# Patient Record
(demographics unavailable — no encounter records)

---

## 2020-01-01 NOTE — NUR
BABY DID NOT EAT FOR MOM RETURNED TO NURSERY VIA OC TEMP 96.9 RECTALLY. UNDER
WARMER WITH TEMP PROBE AND SERVO. NURSE ATTEMPTED TP FEED BABY BABY 
NIPPLE OR CLAMPS JAWS AND WILL NOT SUCK. DSTICK REPEATED 76.

## 2020-01-01 NOTE — NUR
FOB BROUGHT INFANT TO NBN FOR ASSISTANCE WITH DIAPR CHANGE. CORD CLAMP
REMOVED. FOB WITH INFANT BACK TO ROOM

## 2020-01-01 NOTE — NUR
INFANT RETURNED TO MOTHER'S ROOM, BANDS VERIFIED TO MATCH. MOTHER AAOx3.
FATHER RESTING ON BEDSIDE COUCH. INFANT SWADDLED WITH HAT ON AND RESTING IN
BASSINETTE WHILE MOTHER EATS BREAKFAST.

## 2020-01-01 NOTE — NUR
MOTHER REQUESTING  BACK TO ROOM, ID BANDS MATCHED,  HANDED TO
MOTHER, GOOD BONDING NOTED.  TEACHING DONE WITH PARENTS ON BREAST AND BOTTLE
FEEDING, FEEDING SHEET, DIAPERING.  VOICES UNDERSTANDING, DENIES ANY QUESTIONS
AT THIS TIME.

## 2020-01-01 NOTE — NUR
OUT TO ROOM VIA OC ENC MOM AND DAD TO ATTEMPT TO FEED AGAIN IN THE NEXT HOUR
OR SO THAT HER BLOOD SUGAR WAS GOOD, TEMP IS GOOD, BUT NURSE WAS UNABLE TO
GET HER TO TAKE ANY FORMULA. MOM AND DAD AGREED.

## 2020-01-01 NOTE — NUR
BABY IN CRIB AT BEDSIDE MOM HAS FED 10MLS 5 MINUTES AGO. ENC MO TO BURP BABY
AND KEEP FEEDING. MOM ASKED FOR ASSISTANCE BURPING BABY. UP IN NURSES ARMS AND
DEMONSTRATED PROPER BURPING TECHNIQUE. BABY BURPED AND BEGAN TO EAT AGAIN. ENC
MOM TO CONTINUE.

## 2020-01-01 NOTE — NUR
MOM RETURNED BABY TO NURSERY SHE HAD TAKEN APPROX 15MLS OF JAVED. MOM STAED SHE
IS GAGING AND SHE WONT TAKE ANYMORE. MOM ASKED NURSE TO TRY. BABY IN NURSES
ARMS USING NUK NIPPLE BABY ATE 15 MORE MLS WITH MINIMAL STIM AND BURPED
MULTIPLE TIMES.

## 2020-01-01 NOTE — NUR
BABY TEMP RECHECKED 96.6 AXILLARY SWADDLED WITH WARM BLANKET AND ENC MOM TO
FEED AND THEN WE WILL PUT BABY UNDER WARMER.

## 2020-01-01 NOTE — NUR
MOM CALLED NURSERY BABY SPIT ON BLANKETS AND SHIRT. MOM CONCERNED BAY ISNT
EATING. BABY IS ROOTING AND FUSSING. ENC MOM THAT SHE PROBABY IS GASSY AND HAD
SOME AMNIOTIC FLUID THAT SHE HADNT DIGESTED YET SO SHE FELT FULL. ASSSISTED
MOM WITH CHANGING AND SWADDLING BABY.

## 2020-01-01 NOTE — NUR
RET TO NSY IN OPEN CRIB BY MOM. RESTING QUIETLY WITH EYES CLOSED. COLOR WNL.
RESP UNLABORED WITH NO S/S OF DISTRESS NOTED AT THIS TIME. HOB SL ELEVATED.

## 2020-01-01 NOTE — NUR
RETURNED TO NURSERY VIA OC PER MOM'S REQUEST. VSS. ASSESSMENT COMPLETED.
LINENS CHANGED. REMAINS IN NURSERY.

## 2020-01-01 NOTE — NUR
RETURNED TO ROOM VIA OC. EXPLAINED TO MOM THAT BABY ATE WELL BURPING OFTEN
WITH THE NUK NIPPLE. ENC MOM TO FEED AGAIN BETWEEN 1700 AND 1800. MOM AGREED.

## 2020-01-01 NOTE — NUR
INFANT LAYING IN OC AT MOMS BEDSIDE. ASSESSMENT COMPLETED, SEE FLOWSHEET. VSS.
NO DISTRESS NOTED. WILL MONITOR

## 2020-01-01 NOTE — NUR
INFANT DELIVERED VIA VAGINAL DELIVERY BY DR. STEVENS.
 PLACED ON
MOTHER'S CHEST FOR SKIN TO SKIN, LOUD CRY NOTED,  DRIED WITH TOWELS,
SUCTIONED NOSE AND MOUTH WITH BULB SYRINGE, COLOR PINK,  THEN BROUGHT
TO RADIANT WARMER, WEIGHED AND MEASURED, ID BAND #[id number] PLACED ON RIGHT HAND
AND RIGHT FOOT, SECURITY HUG BAND #[id number] PLACED ON LEFT FOOT. DELEE SUCTIONED
6ML OF CLEAR FLUID.   WRAPPED IN WARM BLANKETS AND HANDED TO MOTHER.